# Patient Record
Sex: FEMALE | Race: ASIAN | NOT HISPANIC OR LATINO
[De-identification: names, ages, dates, MRNs, and addresses within clinical notes are randomized per-mention and may not be internally consistent; named-entity substitution may affect disease eponyms.]

---

## 2019-01-28 PROBLEM — Z00.00 ENCOUNTER FOR PREVENTIVE HEALTH EXAMINATION: Status: ACTIVE | Noted: 2019-01-28

## 2019-01-31 ENCOUNTER — APPOINTMENT (OUTPATIENT)
Dept: SURGICAL ONCOLOGY | Facility: CLINIC | Age: 59
End: 2019-01-31
Payer: COMMERCIAL

## 2019-01-31 VITALS
BODY MASS INDEX: 23.39 KG/M2 | SYSTOLIC BLOOD PRESSURE: 130 MMHG | HEIGHT: 63 IN | WEIGHT: 132 LBS | RESPIRATION RATE: 14 BRPM | DIASTOLIC BLOOD PRESSURE: 82 MMHG | OXYGEN SATURATION: 99 % | HEART RATE: 68 BPM

## 2019-01-31 DIAGNOSIS — Z78.9 OTHER SPECIFIED HEALTH STATUS: ICD-10-CM

## 2019-01-31 PROCEDURE — 99204 OFFICE O/P NEW MOD 45 MIN: CPT

## 2019-01-31 NOTE — HISTORY OF PRESENT ILLNESS
[de-identified] : Ms. Wiley, is a 58 year old female who presents today for an initial consultation.  She  was referred by Dr. Demond Castaneda.\par \par Odalis went for her annual breast imaging including mammo/sono in Oct 2018 which revealed a new 6mm nodule in the Left breast 4:00 for which US guided core biopsy was recommended (Birads 4a).  Final pathology revealed intraductal papilloma.  Today she is without any complaints. Denies palpable breast masses, nipple discharge, skin changes, inversion or breast pain. Denies constitutional symptoms. \par \par No other significant PMH\par Denies family history of breast or ovarian cancer.\par \par Menarche: 11 y.o.\par \par Age at first pregnancy: 24 y.o.\par \par Internist: Dr. Demond Castaneda

## 2019-01-31 NOTE — REASON FOR VISIT
[Initial Consultation] : an initial consultation for [Other: _____] : [unfilled] [FreeTextEntry2] : Papilloma

## 2019-01-31 NOTE — ASSESSMENT
[FreeTextEntry1] : Impression:\par Intraductal papilloma Left breast 4:00\par \par \par Plan:\par I have discussed the diagnosis and the operative therapy of excisional biopsy.  Patient expressed verbal understanding and all questions answered.  She expressed that she would like to have the surgery done ASAP.\par My office will contact her with possible surgical dates.\par

## 2019-01-31 NOTE — CONSULT LETTER
[Dear  ___] : Dear  [unfilled], [Consult Letter:] : I had the pleasure of evaluating your patient, [unfilled]. [Please see my note below.] : Please see my note below. [Consult Closing:] : Thank you very much for allowing me to participate in the care of this patient.  If you have any questions, please do not hesitate to contact me. [Sincerely,] : Sincerely, [FreeTextEntry2] : Demond Castaneda MD [FreeTextEntry3] : Christopher Connor MD \par (O) [phone]\par (F) [phone]

## 2019-02-04 ENCOUNTER — OUTPATIENT (OUTPATIENT)
Dept: OUTPATIENT SERVICES | Facility: HOSPITAL | Age: 59
LOS: 1 days | End: 2019-02-04
Payer: COMMERCIAL

## 2019-02-04 ENCOUNTER — RESULT REVIEW (OUTPATIENT)
Age: 59
End: 2019-02-04

## 2019-02-04 DIAGNOSIS — C50.919 MALIGNANT NEOPLASM OF UNSPECIFIED SITE OF UNSPECIFIED FEMALE BREAST: ICD-10-CM

## 2019-02-04 PROCEDURE — 88321 CONSLTJ&REPRT SLD PREP ELSWR: CPT

## 2019-02-28 ENCOUNTER — FORM ENCOUNTER (OUTPATIENT)
Age: 59
End: 2019-02-28

## 2019-03-01 ENCOUNTER — OUTPATIENT (OUTPATIENT)
Dept: OUTPATIENT SERVICES | Facility: HOSPITAL | Age: 59
LOS: 1 days | End: 2019-03-01
Payer: COMMERCIAL

## 2019-03-01 ENCOUNTER — APPOINTMENT (OUTPATIENT)
Dept: ULTRASOUND IMAGING | Facility: IMAGING CENTER | Age: 59
End: 2019-03-01
Payer: COMMERCIAL

## 2019-03-01 VITALS
RESPIRATION RATE: 16 BRPM | SYSTOLIC BLOOD PRESSURE: 127 MMHG | OXYGEN SATURATION: 97 % | DIASTOLIC BLOOD PRESSURE: 85 MMHG | TEMPERATURE: 97 F | WEIGHT: 123.02 LBS | HEIGHT: 62 IN | HEART RATE: 86 BPM

## 2019-03-01 DIAGNOSIS — D24.2 BENIGN NEOPLASM OF LEFT BREAST: ICD-10-CM

## 2019-03-01 DIAGNOSIS — Z00.8 ENCOUNTER FOR OTHER GENERAL EXAMINATION: ICD-10-CM

## 2019-03-01 DIAGNOSIS — Z01.84 ENCOUNTER FOR ANTIBODY RESPONSE EXAMINATION: ICD-10-CM

## 2019-03-01 DIAGNOSIS — Z90.49 ACQUIRED ABSENCE OF OTHER SPECIFIED PARTS OF DIGESTIVE TRACT: Chronic | ICD-10-CM

## 2019-03-01 LAB
ANION GAP SERPL CALC-SCNC: 12 MMOL/L — SIGNIFICANT CHANGE UP (ref 5–17)
BUN SERPL-MCNC: 15 MG/DL — SIGNIFICANT CHANGE UP (ref 7–23)
CALCIUM SERPL-MCNC: 9.8 MG/DL — SIGNIFICANT CHANGE UP (ref 8.4–10.5)
CHLORIDE SERPL-SCNC: 102 MMOL/L — SIGNIFICANT CHANGE UP (ref 96–108)
CO2 SERPL-SCNC: 23 MMOL/L — SIGNIFICANT CHANGE UP (ref 22–31)
CREAT SERPL-MCNC: 0.57 MG/DL — SIGNIFICANT CHANGE UP (ref 0.5–1.3)
GLUCOSE SERPL-MCNC: 147 MG/DL — HIGH (ref 70–99)
HCT VFR BLD CALC: 43.2 % — SIGNIFICANT CHANGE UP (ref 34.5–45)
HGB BLD-MCNC: 14 G/DL — SIGNIFICANT CHANGE UP (ref 11.5–15.5)
MCHC RBC-ENTMCNC: 31.3 PG — SIGNIFICANT CHANGE UP (ref 27–34)
MCHC RBC-ENTMCNC: 32.4 GM/DL — SIGNIFICANT CHANGE UP (ref 32–36)
MCV RBC AUTO: 96.6 FL — SIGNIFICANT CHANGE UP (ref 80–100)
PLATELET # BLD AUTO: 271 K/UL — SIGNIFICANT CHANGE UP (ref 150–400)
POTASSIUM SERPL-MCNC: 3.6 MMOL/L — SIGNIFICANT CHANGE UP (ref 3.5–5.3)
POTASSIUM SERPL-SCNC: 3.6 MMOL/L — SIGNIFICANT CHANGE UP (ref 3.5–5.3)
RBC # BLD: 4.47 M/UL — SIGNIFICANT CHANGE UP (ref 3.8–5.2)
RBC # FLD: 11.7 % — SIGNIFICANT CHANGE UP (ref 10.3–14.5)
SODIUM SERPL-SCNC: 137 MMOL/L — SIGNIFICANT CHANGE UP (ref 135–145)
WBC # BLD: 6.97 K/UL — SIGNIFICANT CHANGE UP (ref 3.8–10.5)
WBC # FLD AUTO: 6.97 K/UL — SIGNIFICANT CHANGE UP (ref 3.8–10.5)

## 2019-03-01 PROCEDURE — 19285 PERQ DEV BREAST 1ST US IMAG: CPT | Mod: LT

## 2019-03-01 PROCEDURE — G0463: CPT

## 2019-03-01 PROCEDURE — C1739: CPT

## 2019-03-01 PROCEDURE — 80048 BASIC METABOLIC PNL TOTAL CA: CPT

## 2019-03-01 PROCEDURE — 19285 PERQ DEV BREAST 1ST US IMAG: CPT

## 2019-03-01 PROCEDURE — 85027 COMPLETE CBC AUTOMATED: CPT

## 2019-03-01 RX ORDER — LIDOCAINE HCL 20 MG/ML
0.2 VIAL (ML) INJECTION ONCE
Qty: 0 | Refills: 0 | Status: DISCONTINUED | OUTPATIENT
Start: 2019-03-11 | End: 2019-03-26

## 2019-03-01 RX ORDER — SODIUM CHLORIDE 9 MG/ML
3 INJECTION INTRAMUSCULAR; INTRAVENOUS; SUBCUTANEOUS EVERY 8 HOURS
Qty: 0 | Refills: 0 | Status: DISCONTINUED | OUTPATIENT
Start: 2019-03-11 | End: 2019-03-26

## 2019-03-01 NOTE — H&P PST ADULT - PROBLEM SELECTOR PLAN 1
left breast lumpectomy post greg  reflector placement 3/11/19.   PST labs send  preprocedure instructions discussed

## 2019-03-01 NOTE — H&P PST ADULT - HISTORY OF PRESENT ILLNESS
58 year old Chinese female refused to use free  services requested to use her daughter in law for translation with left breast lump planned for left breast lumpectomy post greg  reflector placement 3/11/19.

## 2019-03-01 NOTE — H&P PST ADULT - NSANTHOSAYNRD_GEN_A_CORE
No. MOI screening performed.  STOP BANG Legend: 0-2 = LOW Risk; 3-4 = INTERMEDIATE Risk; 5-8 = HIGH Risk

## 2019-03-08 PROBLEM — D24.2 BENIGN NEOPLASM OF LEFT BREAST: Chronic | Status: ACTIVE | Noted: 2019-03-01

## 2019-03-10 ENCOUNTER — FORM ENCOUNTER (OUTPATIENT)
Age: 59
End: 2019-03-10

## 2019-03-11 ENCOUNTER — OUTPATIENT (OUTPATIENT)
Dept: OUTPATIENT SERVICES | Facility: HOSPITAL | Age: 59
LOS: 1 days | End: 2019-03-11
Payer: COMMERCIAL

## 2019-03-11 ENCOUNTER — RESULT REVIEW (OUTPATIENT)
Age: 59
End: 2019-03-11

## 2019-03-11 ENCOUNTER — APPOINTMENT (OUTPATIENT)
Dept: SURGICAL ONCOLOGY | Facility: HOSPITAL | Age: 59
End: 2019-03-11

## 2019-03-11 VITALS
DIASTOLIC BLOOD PRESSURE: 81 MMHG | OXYGEN SATURATION: 100 % | SYSTOLIC BLOOD PRESSURE: 130 MMHG | RESPIRATION RATE: 18 BRPM | WEIGHT: 123.02 LBS | HEIGHT: 62 IN | HEART RATE: 73 BPM | TEMPERATURE: 98 F

## 2019-03-11 VITALS
HEART RATE: 76 BPM | RESPIRATION RATE: 15 BRPM | SYSTOLIC BLOOD PRESSURE: 111 MMHG | DIASTOLIC BLOOD PRESSURE: 68 MMHG | OXYGEN SATURATION: 99 % | TEMPERATURE: 99 F

## 2019-03-11 DIAGNOSIS — Z90.49 ACQUIRED ABSENCE OF OTHER SPECIFIED PARTS OF DIGESTIVE TRACT: Chronic | ICD-10-CM

## 2019-03-11 DIAGNOSIS — D24.2 BENIGN NEOPLASM OF LEFT BREAST: ICD-10-CM

## 2019-03-11 PROCEDURE — 76098 X-RAY EXAM SURGICAL SPECIMEN: CPT | Mod: 26

## 2019-03-11 PROCEDURE — 76098 X-RAY EXAM SURGICAL SPECIMEN: CPT

## 2019-03-11 PROCEDURE — 19125 EXCISION BREAST LESION: CPT | Mod: LT

## 2019-03-11 PROCEDURE — 88307 TISSUE EXAM BY PATHOLOGIST: CPT | Mod: 26

## 2019-03-11 PROCEDURE — 19301 PARTIAL MASTECTOMY: CPT | Mod: LT

## 2019-03-11 PROCEDURE — 88307 TISSUE EXAM BY PATHOLOGIST: CPT

## 2019-03-11 RX ORDER — OXYCODONE HYDROCHLORIDE 5 MG/1
1 TABLET ORAL
Qty: 21 | Refills: 0 | OUTPATIENT
Start: 2019-03-11 | End: 2019-03-17

## 2019-03-11 RX ORDER — ONDANSETRON 8 MG/1
4 TABLET, FILM COATED ORAL ONCE
Qty: 0 | Refills: 0 | Status: DISCONTINUED | OUTPATIENT
Start: 2019-03-11 | End: 2019-03-26

## 2019-03-11 RX ORDER — OXYCODONE HYDROCHLORIDE 5 MG/1
5 TABLET ORAL ONCE
Qty: 0 | Refills: 0 | Status: DISCONTINUED | OUTPATIENT
Start: 2019-03-11 | End: 2019-03-11

## 2019-03-11 RX ORDER — CELECOXIB 200 MG/1
200 CAPSULE ORAL ONCE
Qty: 0 | Refills: 0 | Status: COMPLETED | OUTPATIENT
Start: 2019-03-11 | End: 2019-03-11

## 2019-03-11 RX ORDER — ACETAMINOPHEN 500 MG
1000 TABLET ORAL ONCE
Qty: 0 | Refills: 0 | Status: COMPLETED | OUTPATIENT
Start: 2019-03-11 | End: 2019-03-11

## 2019-03-11 RX ORDER — SODIUM CHLORIDE 9 MG/ML
1000 INJECTION, SOLUTION INTRAVENOUS
Qty: 0 | Refills: 0 | Status: DISCONTINUED | OUTPATIENT
Start: 2019-03-11 | End: 2019-03-26

## 2019-03-11 RX ORDER — CELECOXIB 200 MG/1
200 CAPSULE ORAL ONCE
Qty: 0 | Refills: 0 | Status: DISCONTINUED | OUTPATIENT
Start: 2019-03-11 | End: 2019-03-26

## 2019-03-11 RX ORDER — MULTIVIT-MIN/FERROUS GLUCONATE 9 MG/15 ML
0 LIQUID (ML) ORAL
Qty: 0 | Refills: 0 | COMMUNITY

## 2019-03-11 RX ORDER — CHOLECALCIFEROL (VITAMIN D3) 125 MCG
0 CAPSULE ORAL
Qty: 0 | Refills: 0 | COMMUNITY

## 2019-03-11 NOTE — ASU DISCHARGE PLAN (ADULT/PEDIATRIC) - CALL YOUR DOCTOR IF YOU HAVE ANY OF THE FOLLOWING:
Pain not relieved by Medications/Inability to tolerate liquids or foods/Wound/Surgical Site with redness, or foul smelling discharge or pus/Unable to urinate/Swelling that gets worse/Bleeding that does not stop/Nausea and vomiting that does not stop/Increased irritability or sluggishness

## 2019-03-11 NOTE — ASU PATIENT PROFILE, ADULT - LANGUAGE ASSISTANCE NEEDED
No-Patient/Caregiver offered and refused free interpretation services. No-Patient/Caregiver offered and refused free interpretation services./Min [phone]

## 2019-03-11 NOTE — ASU DISCHARGE PLAN (ADULT/PEDIATRIC) - CARE PROVIDER_API CALL
Christopher Connor)  Surgery  [address] [place] [id number]  Phone: [phone]  Fax: [phone]  Follow Up Time:

## 2019-03-11 NOTE — ASU DISCHARGE PLAN (ADULT/PEDIATRIC) - ASU DC SPECIAL INSTRUCTIONSFT
[facility]  CANCER  I  N  S  T  I  T  U  T E     Department of Surgery  Division of Surgical Oncology    Glenn Carpenter M.D., RANDAL.[email or web address].  Chief, Division of Surgical Oncology    Yoseph Mccloud M.D., F.A.C.S., F.A.S.BAKARI.  Associate , Department of Surgery    Da Cheng M.D., F.A.C.S.  Alfonso Louis M.D., F.A.C.S.  Ross Khan M.D., F.A.C.S.  Nikhil Gibson M.D., F.A.C.S.  Alfonso Rivera M.D., F.A.C.S.  Christopher Connor M.D., F.SHAHIDC.S.      Breast Biopsy/Lumpectomy Post-operative Instructions  1.	Supportive bra- Please bring a sports/athletic bra with you on the day of your surgery.  You will wear it home from the hospital.  You should wear the supportive bra continuously for 48 hours after the procedure, including wearing it to sleep.  Therefore, you may wear your regular bra.  You may remove the bra to shower.  The sports bra will provide support, decrease the amount of swelling at the biopsy site, and make your recovery more comfortable.    2.	Wound dressing- There is a dressing on the wound, which consists of 2 layers.  The outer layer is a clear plastic dressing (called Tegaderm) which is waterproof.  This should remain in place for 2 days post-operatively.  On the 2nd post-operative day, you should remove the clear plastic Tegaderm dressing.  Underneath the Tegaderm dressing, there are white surgical tapes (called steri strips) which are directly adherent to the skin.  These should remain on the skin, and will peel off naturally.  All of the stitches are “internal” and will dissolve naturally.    3.	Showering/Bathing- You may shower over the dressing the very next day after surgery.  Allow the water to run over the dressing, but don not scrub the area.  It is best not to sit in a bathtub or swimming pool for at least one week after surgery.    4.	Activity level- You may resume most normal daily activity as tolerated, but avoid strenuous activities such as aerobics, jogging, exercising or heavy lifting for at least 1 week after surgery.  You may return to work in 1-2 days after surgery.  You may drive as long as you are not taking any prescription pain medication.    5.	Pain Medication- You may take the prescribed medication, or you may take extra-strength Tylenol as needed.  Please don to take aspirin, Motrin, Advil or any other anti-inflammatory medications, as these medications may cause bleeding or bruising.    6.	Follow-up Appointment- Please call the office to schedule your post-operative appointment which should be approximately 10 days after your surgery. Office [phone]    7.	Bruising/Bleeding/Swelling- It is normal for there to be some bruising and swelling at the breast biopsy site, and there may be some staining of blood on to the dressing.  Some discomfort at the surgical site is expected.  If your symptoms seem excessive, or if you have any question or concerns, please call the office.      Anesthesia Precautions:  For the next 12 hours do not:   •	drive a car,  •	drink alcohol, beer, or wine,   •	make important personal or business decisions  Diet:   •	Progress diet slowly unless otherwise indicated    Physician Notification  •	Any Prescription issues  •	Bleeding that does not stop  •	Persistent nausea and vomiting  •	Pain not relieved by medications  •	Fever greater than 101®F  •	Inability to tolerate liquids or foods  •	Unable to urinate after 8 hours  Discharge and Disposition  •	Discharge to home/ group home/assisted living  •	Accompanied by Family/Spouse/ Parents/ Significant Other/ Friend/ and or Caregiver    Follow Up Care:  •	In the event that you develop a complication and you are unable to reach your own physician, you may contact:  [phone] or go to the nearest Emergency Room.   •	Please call your surgeon to schedule your follow up appointment [phone]

## 2019-03-11 NOTE — BRIEF OPERATIVE NOTE - PROCEDURE
<<-----Click on this checkbox to enter Procedure Lumpectomy of left breast  03/11/2019  post greg  Active  DFILARDI

## 2019-03-14 LAB — SURGICAL PATHOLOGY STUDY: SIGNIFICANT CHANGE UP

## 2019-03-21 ENCOUNTER — APPOINTMENT (OUTPATIENT)
Dept: SURGICAL ONCOLOGY | Facility: CLINIC | Age: 59
End: 2019-03-21
Payer: COMMERCIAL

## 2019-03-21 VITALS
DIASTOLIC BLOOD PRESSURE: 77 MMHG | BODY MASS INDEX: 22.32 KG/M2 | RESPIRATION RATE: 17 BRPM | WEIGHT: 126 LBS | HEART RATE: 75 BPM | HEIGHT: 63 IN | TEMPERATURE: 98.1 F | OXYGEN SATURATION: 96 % | SYSTOLIC BLOOD PRESSURE: 116 MMHG

## 2019-03-21 PROCEDURE — 99024 POSTOP FOLLOW-UP VISIT: CPT

## 2019-03-21 NOTE — HISTORY OF PRESENT ILLNESS
[de-identified] : Ms. Wiley, is a 59 year old female who presents today her initial post op evaluation.  \par \par Odalis went for her annual breast imaging including mammo/sono in Oct 2018 which revealed a new 6mm nodule in the Left breast 4:00 for which US guided biopsy was recommended.  Final path revealed an intraductal papilloma.  She denied palpable breast masses, nipple discharge, skin changes, inversion or breast pain. \par \par She is now s/p Left breast lumpectomy on 3/11/19.  Final pathology was consistent with intraductal papilloma.  She denies post op fevers or constitutional symptoms.  She is experiencing some Left breast discomfort.\par \par No other significant PMH\par Denies family history of breast or ovarian cancer.\par \par Menarche: 11 y.o.\par \par Age at first pregnancy: 24 y.o.\par \par Internist: Dr. Demond Castaneda

## 2019-03-21 NOTE — ASSESSMENT
[FreeTextEntry1] : Impression:\par s/p Left breast lumpectomy on 3/11/19.  Final path of intraductal papilloma\par \par \par Plan:\par RTO in 6 months for evaluation.\par

## 2019-03-21 NOTE — REASON FOR VISIT
[Post-Op] : a post-op for [Other: _____] : [unfilled] [FreeTextEntry2] : s/p Left breast lumpectomy on 3/11/19.

## 2019-03-21 NOTE — PHYSICAL EXAM
[Normal] : well developed, well nourished, in no acute distress [de-identified] : Left breast incision healing well without signs of infection or hematoma.  Small seroma noted without need of aspiration.

## 2019-04-11 ENCOUNTER — EMERGENCY (EMERGENCY)
Facility: HOSPITAL | Age: 59
LOS: 1 days | Discharge: HOME | End: 2019-04-11
Attending: EMERGENCY MEDICINE | Admitting: EMERGENCY MEDICINE
Payer: COMMERCIAL

## 2019-04-11 VITALS
RESPIRATION RATE: 16 BRPM | DIASTOLIC BLOOD PRESSURE: 74 MMHG | HEART RATE: 100 BPM | WEIGHT: 125 LBS | HEIGHT: 60 IN | OXYGEN SATURATION: 99 % | SYSTOLIC BLOOD PRESSURE: 135 MMHG | TEMPERATURE: 98 F

## 2019-04-11 VITALS
HEART RATE: 94 BPM | DIASTOLIC BLOOD PRESSURE: 79 MMHG | SYSTOLIC BLOOD PRESSURE: 126 MMHG | OXYGEN SATURATION: 100 % | TEMPERATURE: 98 F | RESPIRATION RATE: 14 BRPM

## 2019-04-11 DIAGNOSIS — Z90.49 ACQUIRED ABSENCE OF OTHER SPECIFIED PARTS OF DIGESTIVE TRACT: Chronic | ICD-10-CM

## 2019-04-11 PROCEDURE — 73030 X-RAY EXAM OF SHOULDER: CPT | Mod: 26,50

## 2019-04-11 PROCEDURE — 73030 X-RAY EXAM OF SHOULDER: CPT

## 2019-04-11 PROCEDURE — 99284 EMERGENCY DEPT VISIT MOD MDM: CPT

## 2019-04-11 PROCEDURE — 73080 X-RAY EXAM OF ELBOW: CPT | Mod: 26,LT

## 2019-04-11 PROCEDURE — 72040 X-RAY EXAM NECK SPINE 2-3 VW: CPT | Mod: 26

## 2019-04-11 PROCEDURE — 99284 EMERGENCY DEPT VISIT MOD MDM: CPT | Mod: 25

## 2019-04-11 PROCEDURE — 72040 X-RAY EXAM NECK SPINE 2-3 VW: CPT

## 2019-04-11 PROCEDURE — 73080 X-RAY EXAM OF ELBOW: CPT

## 2019-04-11 RX ORDER — IBUPROFEN 200 MG
600 TABLET ORAL ONCE
Qty: 0 | Refills: 0 | Status: COMPLETED | OUTPATIENT
Start: 2019-04-11 | End: 2019-04-11

## 2019-04-11 RX ORDER — LIDOCAINE 4 G/100G
1 CREAM TOPICAL ONCE
Qty: 0 | Refills: 0 | Status: COMPLETED | OUTPATIENT
Start: 2019-04-11 | End: 2019-04-11

## 2019-04-11 RX ADMIN — Medication 600 MILLIGRAM(S): at 14:19

## 2019-04-11 RX ADMIN — Medication 600 MILLIGRAM(S): at 15:33

## 2019-04-11 RX ADMIN — LIDOCAINE 1 PATCH: 4 CREAM TOPICAL at 14:20
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
## 2019-04-11 NOTE — ED PROVIDER NOTE - CHPI ED SYMPTOMS NEG
no headache/no loss of consciousness/no dizziness/no neck tenderness/no disorientation/no difficulty bearing weight

## 2019-04-11 NOTE — ED PROVIDER NOTE - PHYSICAL EXAMINATION
Spine Exam:     Cervical: No erythema, ecchymosis, or visible deformity. No midline tenderness or step-off appreciated on palpation. No paravertebral tenderness. No muscle spasm. FROM. NEG NEXUS criteria.          Thoracic: No erythema, ecchymosis, or visible deformity. No midline tenderness or step-off appreciated on palpation. No paravertebral tenderness. No muscle spasm.           Lumbosacral: No erythema, ecchymosis, or visible deformity. No midline tenderness or step-off appreciated on palpation. No paravertebral tenderness. No muscle spasm.    ms r ue:+ r shoulder ttp. no deformity. from. sensation intact   ms lue:+ left shoulder ttp. from. left elbow ttp with ecchymosis from. sensation intact.

## 2019-04-11 NOTE — ED ADULT NURSE NOTE - CHPI ED NUR SYMPTOMS NEG
no loss of consciousness/no disorientation/no dizziness/no headache/no bruising/no acting out behaviors/no laceration

## 2019-04-11 NOTE — ED PROVIDER NOTE - NSFOLLOWUPINSTRUCTIONS_ED_ALL_ED_FT
1. FOLLOW UP WITH YOUR DOCTOR IN 24-48 HOURS.  2. FOLLOW UP WITH ALL SPECIALIST DISCUSSED DURING YOUR VISIT.  3. TAKE IBUPROFEN 600MG EVERY 6 HOURS WITH FOOD AS NEEDED FOR PAIN.  4. USE WARM COMPRESS ON NECK AS NEEDED FOR PAIN  5. BUY OVER THE COUNTER SALONPAS LIDOCAINE PATCHES FOR NECK/ BACK PAIN AND USE AS NEEDED.
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
## 2019-04-11 NOTE — ED PROVIDER NOTE - CARE PROVIDER_API CALL
Armando Beyer)  Orthopaedic Surgery Orthopedic Surgery  [address] [facility] [address]  Phone: [phone]  Fax: [phone]  Follow Up Time: 1-3 Days
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
## 2019-09-28 NOTE — PHYSICAL EXAM
Onset: 4 hours ago   Location / description: Patient met up with a friend at [facility], Mom/daughter and friend all drank from the same straw,  Her friend just called to notify her that she was tested positive for strep throat.  Giovanni drank from Mom's cup tonight.   Precipitating Factors: n/a  Pain Scale (1 - 10), 10 highest: n/a  Associated Symptoms: denies any symptoms  What improves/worsens symptoms: n/a  Symptom specific medications: n/a  LMP : Patient's last menstrual period was 07/08/2019.  Are you pregnant or breast feeding: n/a  Recent Care: 6/22/2019  [facility]    PCPs   Rios Warren MD           Advised patient to call back with new or worsening symptoms.  Advised patient to seek medical treatment within the next 24 hours, with symptoms.  Advised to call back with additional questions/concerns.  Verbalized Understanding  Reason for Disposition  • [1] Strep throat EXPOSURE (i.e., meets definition) AND [2] no sore throat  (all triage questions negative)    Protocols used: STREP THROAT EXPOSURE-A-       [Normal] : supple, no neck mass and thyroid not enlarged [Normal Supraclavicular Lymph Nodes] : normal supraclavicular lymph nodes [Normal Axillary Lymph Nodes] : normal axillary lymph nodes [Normal] : oriented to person, place and time, with appropriate affect [FreeTextEntry1] : RC present for exam.  [de-identified] : Normal S1, S2. Regular rate and rhythm. [de-identified] : Complete breast exam performed in supine and upright positions. No palpable masses, tenderness, nipple discharge, inversion, deviation or enlarged axillary or supraclavicular lymph nodes.  [de-identified] : Clear breath sounds bilaterally, normal respiratory effort.

## 2019-10-10 ENCOUNTER — APPOINTMENT (OUTPATIENT)
Dept: SURGICAL ONCOLOGY | Facility: CLINIC | Age: 59
End: 2019-10-10
Payer: COMMERCIAL

## 2019-10-10 VITALS
SYSTOLIC BLOOD PRESSURE: 121 MMHG | HEIGHT: 63 IN | WEIGHT: 128 LBS | TEMPERATURE: 98.5 F | HEART RATE: 72 BPM | OXYGEN SATURATION: 96 % | DIASTOLIC BLOOD PRESSURE: 74 MMHG | RESPIRATION RATE: 17 BRPM | BODY MASS INDEX: 22.68 KG/M2

## 2019-10-10 DIAGNOSIS — Z08 ENCOUNTER FOR FOLLOW-UP EXAMINATION AFTER COMPLETED TREATMENT FOR MALIGNANT NEOPLASM: ICD-10-CM

## 2019-10-10 DIAGNOSIS — Z85.3 ENCOUNTER FOR FOLLOW-UP EXAMINATION AFTER COMPLETED TREATMENT FOR MALIGNANT NEOPLASM: ICD-10-CM

## 2019-10-10 PROCEDURE — 99213 OFFICE O/P EST LOW 20 MIN: CPT

## 2019-10-10 NOTE — ASSESSMENT
[FreeTextEntry1] : Imp:\par Pt is s/p LT breast lumpectomy. Final Pathology revealed it to be an intraductal LT papilloma. \par No evidence of new or recurrent lesions. Breast imaging failed to identify any suspicious lesions. No suspicious lesions on exam. \par \par Plan:\par Continue yearly surveillance with MMG/US due this November 2019.\par \par \par

## 2019-10-10 NOTE — PHYSICAL EXAM
[Normal] : supple, no neck mass and thyroid not enlarged [Normal Neck Lymph Nodes] : normal neck lymph nodes  [Normal Groin Lymph Nodes] : normal groin lymph nodes [Normal] : oriented to person, place and time, with appropriate affect [FreeTextEntry1] : I, Gerard Townsend, was present for the physical exam.\par  [de-identified] : Complete normal breast examination performed supine and upright revealed no palpable masses, nipple discharge, inversion, deviation, or enlarge axillary lymph nodes, or supraclavicular lymph nodes. [de-identified] : Normal S1,S2. Regular rate and rhythm [de-identified] : Clear breath sounds bilaterally, normal respiratory effort\par \par

## 2019-10-10 NOTE — ADDENDUM
[FreeTextEntry1] : I, Gerard Townsend, acted soley as a scribe for Dr. Christopher Connor on 10/10/2019\par

## 2019-10-10 NOTE — HISTORY OF PRESENT ILLNESS
[de-identified] : 60 y/o female presents for a 6 month f/u visit. Pt c/o of breast  pain in the LT breast at the site of the surgery. \par \par She is s/p LT breast lumpectomy performed on 3/11/19. The final pathology revealed an intraductal papilloma. \par \par Past Hx:\par MMG/US from Oct 2018 revealed a 6mm nodule in LT breast 4:00 where an US guided biopsy was recommended. \par \par Today the pt was w/o any complaints. Denies palpable breast masses, nipple discharge, skin changes, inversion of breast pain. Denies constitutional symptoms

## 2020-01-27 NOTE — ED PROVIDER NOTE - CROS ED SKIN ALL NEG
patient states that she has not been eating for 4 days, states that she feels sick when she sees food, feeling nauseous vomited a couple days ago and HA
negative...

## 2020-02-10 NOTE — ASU PREOP CHECKLIST - PATIENT SENT TO
Patient has some questions regarding her referral for [facility], patient would like you to call her    operating room

## 2020-03-10 PROBLEM — D24.2 INTRADUCTAL PAPILLOMA OF BREAST, LEFT: Status: ACTIVE | Noted: 2019-01-31

## 2020-03-12 ENCOUNTER — APPOINTMENT (OUTPATIENT)
Dept: SURGICAL ONCOLOGY | Facility: CLINIC | Age: 60
End: 2020-03-12

## 2020-03-12 DIAGNOSIS — D24.2 BENIGN NEOPLASM OF LEFT BREAST: ICD-10-CM

## 2020-06-19 ENCOUNTER — TRANSCRIPTION ENCOUNTER (OUTPATIENT)
Age: 60
End: 2020-06-19

## 2020-07-04 ENCOUNTER — TRANSCRIPTION ENCOUNTER (OUTPATIENT)
Age: 60
End: 2020-07-04

## 2020-08-02 ENCOUNTER — TRANSCRIPTION ENCOUNTER (OUTPATIENT)
Age: 60
End: 2020-08-02

## 2025-08-28 ENCOUNTER — NON-APPOINTMENT (OUTPATIENT)
Age: 65
End: 2025-08-28

## 2025-08-28 ENCOUNTER — APPOINTMENT (OUTPATIENT)
Dept: ORTHOPEDIC SURGERY | Facility: CLINIC | Age: 65
End: 2025-08-28

## 2025-08-28 DIAGNOSIS — S52.591A OTHER FRACTURES OF LOWER END OF RIGHT RADIUS, INITIAL ENCOUNTER FOR CLOSED FRACTURE: ICD-10-CM

## 2025-09-05 ENCOUNTER — APPOINTMENT (OUTPATIENT)
Dept: ORTHOPEDIC SURGERY | Facility: CLINIC | Age: 65
End: 2025-09-05